# Patient Record
Sex: MALE | Race: BLACK OR AFRICAN AMERICAN | NOT HISPANIC OR LATINO | Employment: FULL TIME | URBAN - METROPOLITAN AREA
[De-identification: names, ages, dates, MRNs, and addresses within clinical notes are randomized per-mention and may not be internally consistent; named-entity substitution may affect disease eponyms.]

---

## 2020-02-05 ENCOUNTER — APPOINTMENT (EMERGENCY)
Dept: RADIOLOGY | Facility: HOSPITAL | Age: 35
End: 2020-02-05

## 2020-02-05 ENCOUNTER — HOSPITAL ENCOUNTER (EMERGENCY)
Facility: HOSPITAL | Age: 35
Discharge: HOME | End: 2020-02-05
Attending: EMERGENCY MEDICINE | Admitting: EMERGENCY MEDICINE

## 2020-02-05 VITALS
HEART RATE: 88 BPM | OXYGEN SATURATION: 97 % | WEIGHT: 147.71 LBS | RESPIRATION RATE: 17 BRPM | DIASTOLIC BLOOD PRESSURE: 55 MMHG | SYSTOLIC BLOOD PRESSURE: 108 MMHG | TEMPERATURE: 98.8 F

## 2020-02-05 DIAGNOSIS — J18.9 PNEUMONIA: Primary | ICD-10-CM

## 2020-02-05 PROCEDURE — 99284 EMERGENCY DEPT VISIT MOD MDM: CPT | Performed by: EMERGENCY MEDICINE

## 2020-02-05 PROCEDURE — 71046 X-RAY EXAM CHEST 2 VIEWS: CPT

## 2020-02-05 PROCEDURE — 99284 EMERGENCY DEPT VISIT MOD MDM: CPT

## 2020-02-05 PROCEDURE — 94640 AIRWAY INHALATION TREATMENT: CPT

## 2020-02-05 RX ORDER — ALBUTEROL SULFATE 2.5 MG/3ML
5 SOLUTION RESPIRATORY (INHALATION) ONCE
Status: COMPLETED | OUTPATIENT
Start: 2020-02-05 | End: 2020-02-05

## 2020-02-05 RX ORDER — PREDNISONE 20 MG/1
20 TABLET ORAL 2 TIMES DAILY
Qty: 10 TABLET | Refills: 0 | Status: SHIPPED | OUTPATIENT
Start: 2020-02-05 | End: 2020-02-06 | Stop reason: SDUPTHER

## 2020-02-05 RX ORDER — ALBUTEROL SULFATE 2.5 MG/3ML
2.5 SOLUTION RESPIRATORY (INHALATION) EVERY 6 HOURS PRN
COMMUNITY
End: 2020-02-06 | Stop reason: SDUPTHER

## 2020-02-05 RX ORDER — ONDANSETRON 4 MG/1
4 TABLET, ORALLY DISINTEGRATING ORAL EVERY 6 HOURS PRN
Qty: 8 TABLET | Refills: 0 | Status: SHIPPED | OUTPATIENT
Start: 2020-02-05 | End: 2020-02-06 | Stop reason: SDUPTHER

## 2020-02-05 RX ORDER — DOXYCYCLINE HYCLATE 100 MG/1
100 CAPSULE ORAL 2 TIMES DAILY
Qty: 20 CAPSULE | Refills: 0 | Status: SHIPPED | OUTPATIENT
Start: 2020-02-05 | End: 2020-02-06 | Stop reason: SDUPTHER

## 2020-02-05 RX ADMIN — IPRATROPIUM BROMIDE 0.5 MG: 0.5 SOLUTION RESPIRATORY (INHALATION) at 09:38

## 2020-02-05 RX ADMIN — ALBUTEROL SULFATE 5 MG: 2.5 SOLUTION RESPIRATORY (INHALATION) at 09:38

## 2020-02-05 RX ADMIN — PREDNISONE 50 MG: 20 TABLET ORAL at 09:37

## 2020-02-05 NOTE — DISCHARGE INSTRUCTIONS
Community Acquired Pneumonia   WHAT YOU NEED TO KNOW:   Community-acquired pneumonia (CAP) is a lung infection that you get outside of a hospital or nursing home setting  Your lungs become inflamed and cannot work well  CAP may be caused by bacteria, viruses, or fungi  DISCHARGE INSTRUCTIONS:   Return to the emergency department if:   You are confused and cannot think clearly  You have increased trouble breathing  Your lips or fingernails turn gray or blue  Contact your healthcare provider if:   Your symptoms do not get better, or they get worse  You are urinating less, or not at all  Follow up with your healthcare provider: You may need another x-ray  Deep breathing and coughing:  Deep breathing helps open the air passages in your lungs  Coughing helps bring up mucus from your lungs  Take a deep breath and hold the breath as long as you can  Then push the air out of your lungs with a deep, strong cough  Spit out any mucus you have coughed up  Take 10 deep breaths in a row every hour that you are awake  Remember to follow each deep breath with a cough  Do not smoke or allow others to smoke around you:  Nicotine and other chemicals in cigarettes and cigars can cause lung damage  Ask your healthcare provider for information if you currently smoke and need help to quit  E-cigarettes or smokeless tobacco still contain nicotine  Talk to your healthcare provider before you use these products  Manage CAP at home:   Breathe warm, moist air  This helps loosen mucus  Loosely place a warm, wet washcloth over your nose and mouth  A room humidifier may also help make the air moist     Drink liquids as directed  Ask your healthcare provider how much liquid to drink each day and which liquids to drink  Liquids help make mucus thin and easier to get out of your body  Gently tap your chest   This helps loosen mucus so it is easier to cough   Lie with your head lower than your chest several times a day and tap your chest      Get plenty of rest   Rest helps your body heal   Prevent CAP:   Wash your hands often with soap and water  Carry germ-killing hand gel with you  You can use the gel to clean your hands when soap and water are not available  Do not touch your eyes, nose, or mouth unless you have washed your hands first      Clean surfaces often  Clean doorknobs, countertops, cell phones, and other surfaces that are touched often  Always cover your mouth when you cough  Cough into a tissue or your shirtsleeve so you do not spread germs from your hands  Try to avoid people who have a cold or the flu  If you are sick, stay away from others as much as possible  Ask about vaccines  You may need a vaccine to help prevent pneumonia  Get an influenza (flu) vaccine every year as soon as it becomes available

## 2020-02-05 NOTE — ED PROVIDER NOTES
History  Chief Complaint   Patient presents with    Shortness of Breath     Pt  reports girlfriend has the flu and now he has it  Pt  reports nasal congestion and cough  Pt  reports SOB and dizziness  Pt  reports hx of asthma  Pt  SOB during triage  26 yo male with asthma, has not required admission since he was a child, never intubated, c/o onset of URI symptoms and exposure to both strep and flu in his household, was sick with flu like symptoms over 1 week ago, recovered to feel better after several days, then had recurrence of a much worse cough, associated with shortness of breath, and posttussive vomiting today  He is sneezing frequently in the ED, with nasal congestion      History provided by:  Patient  URI   Presenting symptoms: congestion, cough and sore throat    Severity:  Moderate  Onset quality:  Gradual  Duration:  1 week  Timing:  Sporadic  Progression:  Worsening  Chronicity:  New  Relieved by:  Nothing  Worsened by:  Breathing  Ineffective treatments:  Nebulizer treatments  Associated symptoms: wheezing    Risk factors: sick contacts    Risk factors: no immunosuppression        Prior to Admission Medications   Prescriptions Last Dose Informant Patient Reported? Taking? albuterol (2 5 mg/3 mL) 0 083 % nebulizer solution   Yes Yes   Sig: Take 2 5 mg by nebulization every 6 (six) hours as needed for wheezing or shortness of breath      Facility-Administered Medications: None       Past Medical History:   Diagnosis Date    Asthma        History reviewed  No pertinent surgical history  History reviewed  No pertinent family history  I have reviewed and agree with the history as documented  Social History     Tobacco Use    Smoking status: Light Tobacco Smoker    Smokeless tobacco: Never Used   Substance Use Topics    Alcohol use: Not Currently    Drug use: Never        Review of Systems   HENT: Positive for congestion and sore throat      Respiratory: Positive for cough, chest tightness and wheezing  Gastrointestinal: Positive for nausea and vomiting  All other systems reviewed and are negative  Physical Exam  Physical Exam   Constitutional: He appears well-developed and well-nourished  HENT:   Head: Atraumatic  Head is without Ly's sign, without abrasion, without contusion, without laceration, without right periorbital erythema and without left periorbital erythema  Right Ear: Tympanic membrane and external ear normal  No lacerations  Left Ear: Tympanic membrane and external ear normal  No lacerations  Nose: Mucosal edema and rhinorrhea present  No nose lacerations, nasal deformity, septal deviation or nasal septal hematoma  No epistaxis  Mouth/Throat: No lacerations  Eyes: Pupils are equal, round, and reactive to light  Conjunctivae and EOM are normal  Right eye exhibits normal extraocular motion  Left eye exhibits normal extraocular motion  Neck: Trachea normal, normal range of motion, full passive range of motion without pain and phonation normal  No spinous process tenderness present  Normal range of motion present  Cardiovascular: Normal rate, regular rhythm, normal heart sounds, intact distal pulses and normal pulses  Pulmonary/Chest: Effort normal  No accessory muscle usage  Tachypnea noted  No respiratory distress  He has wheezes  He exhibits no tenderness, no bony tenderness, no laceration, no crepitus, no deformity and no retraction  Abdominal: Soft  Normal appearance  There is no tenderness  Neurological: He is alert  He has normal strength and normal reflexes  No cranial nerve deficit or sensory deficit  Gait normal  GCS eye subscore is 4  GCS verbal subscore is 5  GCS motor subscore is 6  Skin: Skin is warm, dry and intact  Psychiatric: He has a normal mood and affect  His speech is normal and behavior is normal  Judgment and thought content normal  Cognition and memory are normal    Nursing note and vitals reviewed        Vital Signs  ED Triage Vitals [02/05/20 0900]   Temperature Pulse Respirations Blood Pressure SpO2   98 8 °F (37 1 °C) 81 (!) 24 111/59 100 %      Temp Source Heart Rate Source Patient Position - Orthostatic VS BP Location FiO2 (%)   Temporal Monitor Sitting Left arm --      Pain Score       --           Vitals:    02/05/20 0900 02/05/20 1010   BP: 111/59 108/55   Pulse: 81 88   Patient Position - Orthostatic VS: Sitting Lying         Visual Acuity      ED Medications  Medications   albuterol inhalation solution 5 mg (5 mg Nebulization Given 2/5/20 0938)   predniSONE tablet 50 mg (50 mg Oral Given 2/5/20 0937)   ipratropium (ATROVENT) 0 02 % inhalation solution 0 5 mg (0 5 mg Nebulization Given 2/5/20 1283)       Diagnostic Studies  Results Reviewed     None                 XR chest 2 views   ED Interpretation by Colt Rubi MD (02/05 1029)   RML infiltrate      Final Result by Shalini Carson MD (02/05 1047)      Right middle lobe pneumonia  Workstation performed: [id number]                    Procedures  Procedures         ED Course  ED Course as of Feb 05 1359 Wed Feb 05, 2020   1029 RML infiltrate   XR chest 2 views                               MDM      Disposition  Final diagnoses:   Pneumonia     Time reflects when diagnosis was documented in both MDM as applicable and the Disposition within this note     Time User Action Codes Description Comment    2/5/2020 10:42 AM Den Reasons Add [J18 9] Pneumonia       ED Disposition     ED Disposition Condition Date/Time Comment    Discharge Good Wed Feb 5, 2020 10:45 AM Patrick Suh Certain discharge to home/self care              Follow-up Information     Follow up With Specialties Details Why Contact Info    [facility]  Call  For followup with primary care [phone]            Discharge Medication List as of 2/5/2020 10:46 AM      START taking these medications    Details   dextromethorphan-guaifenesin (MUCINEX DM)  MG per 12 hr tablet Take 1 tablet by mouth every 12 (twelve) hours as needed for cough, Starting Wed 2/5/2020, Print      doxycycline hyclate (VIBRAMYCIN) 100 mg capsule Take 1 capsule (100 mg total) by mouth 2 (two) times a day for 10 days, Starting Wed 2/5/2020, Until Sat 2/15/2020, Print      ondansetron (ZOFRAN-ODT) 4 mg disintegrating tablet Take 1 tablet (4 mg total) by mouth every 6 (six) hours as needed for nausea or vomiting, Starting Wed 2/5/2020, Print      predniSONE 20 mg tablet Take 1 tablet (20 mg total) by mouth 2 (two) times a day, Starting Wed 2/5/2020, Print         CONTINUE these medications which have NOT CHANGED    Details   albuterol (2 5 mg/3 mL) 0 083 % nebulizer solution Take 2 5 mg by nebulization every 6 (six) hours as needed for wheezing or shortness of breath, Historical Med           No discharge procedures on file      ED Provider  Electronically Signed by           Avery Booth MD  02/05/20 0873

## 2020-02-06 ENCOUNTER — HOSPITAL ENCOUNTER (EMERGENCY)
Facility: HOSPITAL | Age: 35
Discharge: HOME | End: 2020-02-06
Attending: EMERGENCY MEDICINE | Admitting: EMERGENCY MEDICINE

## 2020-02-06 VITALS
OXYGEN SATURATION: 100 % | HEART RATE: 63 BPM | TEMPERATURE: 99.7 F | RESPIRATION RATE: 16 BRPM | DIASTOLIC BLOOD PRESSURE: 60 MMHG | SYSTOLIC BLOOD PRESSURE: 131 MMHG

## 2020-02-06 DIAGNOSIS — J18.9 PNEUMONIA: Primary | ICD-10-CM

## 2020-02-06 DIAGNOSIS — Z00.8 MEDICAL CLEARANCE FOR INCARCERATION: ICD-10-CM

## 2020-02-06 DIAGNOSIS — R05.9 COUGH: ICD-10-CM

## 2020-02-06 PROCEDURE — 94640 AIRWAY INHALATION TREATMENT: CPT

## 2020-02-06 PROCEDURE — 99283 EMERGENCY DEPT VISIT LOW MDM: CPT

## 2020-02-06 PROCEDURE — 99284 EMERGENCY DEPT VISIT MOD MDM: CPT | Performed by: EMERGENCY MEDICINE

## 2020-02-06 RX ORDER — PREDNISONE 20 MG/1
40 TABLET ORAL ONCE
Status: COMPLETED | OUTPATIENT
Start: 2020-02-06 | End: 2020-02-06

## 2020-02-06 RX ORDER — GUAIFENESIN 600 MG
600 TABLET, EXTENDED RELEASE 12 HR ORAL ONCE
Status: DISCONTINUED | OUTPATIENT
Start: 2020-02-06 | End: 2020-02-06 | Stop reason: HOSPADM

## 2020-02-06 RX ORDER — PREDNISONE 20 MG/1
20 TABLET ORAL 2 TIMES DAILY
Qty: 10 TABLET | Refills: 0 | Status: SHIPPED | OUTPATIENT
Start: 2020-02-06

## 2020-02-06 RX ORDER — DOXYCYCLINE HYCLATE 100 MG/1
100 CAPSULE ORAL ONCE
Status: COMPLETED | OUTPATIENT
Start: 2020-02-06 | End: 2020-02-06

## 2020-02-06 RX ORDER — ALBUTEROL SULFATE 2.5 MG/3ML
2.5 SOLUTION RESPIRATORY (INHALATION) EVERY 6 HOURS PRN
Qty: 30 VIAL | Refills: 0 | Status: SHIPPED | OUTPATIENT
Start: 2020-02-06

## 2020-02-06 RX ORDER — ONDANSETRON 4 MG/1
4 TABLET, ORALLY DISINTEGRATING ORAL EVERY 6 HOURS PRN
Qty: 8 TABLET | Refills: 0 | Status: SHIPPED | OUTPATIENT
Start: 2020-02-06

## 2020-02-06 RX ORDER — ONDANSETRON 4 MG/1
4 TABLET, ORALLY DISINTEGRATING ORAL ONCE
Status: COMPLETED | OUTPATIENT
Start: 2020-02-06 | End: 2020-02-06

## 2020-02-06 RX ORDER — ALBUTEROL SULFATE 2.5 MG/3ML
2.5 SOLUTION RESPIRATORY (INHALATION) ONCE
Status: COMPLETED | OUTPATIENT
Start: 2020-02-06 | End: 2020-02-06

## 2020-02-06 RX ORDER — DOXYCYCLINE HYCLATE 100 MG/1
100 CAPSULE ORAL 2 TIMES DAILY
Qty: 20 CAPSULE | Refills: 0 | Status: SHIPPED | OUTPATIENT
Start: 2020-02-06 | End: 2020-02-16

## 2020-02-06 RX ADMIN — DOXYCYCLINE 100 MG: 100 CAPSULE ORAL at 02:30

## 2020-02-06 RX ADMIN — ALBUTEROL SULFATE 2.5 MG: 2.5 SOLUTION RESPIRATORY (INHALATION) at 02:47

## 2020-02-06 RX ADMIN — ONDANSETRON 4 MG: 4 TABLET, ORALLY DISINTEGRATING ORAL at 02:30

## 2020-02-06 RX ADMIN — PREDNISONE 40 MG: 20 TABLET ORAL at 02:30

## 2020-02-06 NOTE — ED PROVIDER NOTES
History  Chief Complaint   Patient presents with    Cough     Patient diagnosed with pneumonia  Complains of cough and shortness of breath  Needs incarceration clearance  [address] yo male who was here yesterday with c/o cough and diagnosed with RML - has not filled any of his meds (doxy, mucinex, prednisone or zofran) and returns under arrest with need for medical clearance  Pt in no distress, sats 100%  History provided by:  Patient and police   used: No    Cough   Cough characteristics:  Productive  Sputum characteristics:  White  Severity:  Moderate  Onset quality:  Gradual  Duration:  2 days  Timing:  Constant  Progression:  Unchanged  Chronicity:  New  Context: sick contacts    Relieved by:  Nothing  Worsened by:  Deep breathing, exposure to cold air and activity  Ineffective treatments:  None tried  Associated symptoms: chills, rhinorrhea, shortness of breath and wheezing    Associated symptoms: no chest pain, no fever, no headaches, no rash and no sore throat    Shortness of breath:     Severity:  Mild    Onset quality:  Gradual    Duration:  2 days    Timing:  Constant    Progression:  Waxing and waning  Wheezing:     Severity:  Mild    Onset quality:  Gradual    Duration:  2 days    Timing:  Intermittent      Prior to Admission Medications   Prescriptions Last Dose Informant Patient Reported? Taking?    albuterol (2 5 mg/3 mL) 0 083 % nebulizer solution   Yes No   Sig: Take 2 5 mg by nebulization every 6 (six) hours as needed for wheezing or shortness of breath   albuterol (2 5 mg/3 mL) 0 083 % nebulizer solution   No No   Sig: Take 1 vial (2 5 mg total) by nebulization every 6 (six) hours as needed for wheezing or shortness of breath   dextromethorphan-guaifenesin (MUCINEX DM)  MG per 12 hr tablet   No No   Sig: Take 1 tablet by mouth every 12 (twelve) hours as needed for cough   dextromethorphan-guaifenesin (MUCINEX DM)  MG per 12 hr tablet   No No   Sig: Take 1 tablet by mouth every 12 (twelve) hours as needed for cough   doxycycline hyclate (VIBRAMYCIN) 100 mg capsule   No No   Sig: Take 1 capsule (100 mg total) by mouth 2 (two) times a day for 10 days   doxycycline hyclate (VIBRAMYCIN) 100 mg capsule   No No   Sig: Take 1 capsule (100 mg total) by mouth 2 (two) times a day for 10 days   ondansetron (ZOFRAN-ODT) 4 mg disintegrating tablet   No No   Sig: Take 1 tablet (4 mg total) by mouth every 6 (six) hours as needed for nausea or vomiting   ondansetron (ZOFRAN-ODT) 4 mg disintegrating tablet   No No   Sig: Take 1 tablet (4 mg total) by mouth every 6 (six) hours as needed for nausea or vomiting   predniSONE 20 mg tablet   No No   Sig: Take 1 tablet (20 mg total) by mouth 2 (two) times a day   predniSONE 20 mg tablet   No No   Sig: Take 1 tablet (20 mg total) by mouth 2 (two) times a day      Facility-Administered Medications: None       Past Medical History:   Diagnosis Date    Asthma        History reviewed  No pertinent surgical history  History reviewed  No pertinent family history  I have reviewed and agree with the history as documented  Social History     Tobacco Use    Smoking status: Light Tobacco Smoker    Smokeless tobacco: Never Used   Substance Use Topics    Alcohol use: Not Currently    Drug use: Never        Review of Systems   Constitutional: Positive for chills and fatigue  Negative for fever  HENT: Positive for congestion and rhinorrhea  Negative for sore throat  Eyes: Negative for visual disturbance  Respiratory: Positive for cough, shortness of breath and wheezing  Cardiovascular: Negative for chest pain and palpitations  Gastrointestinal: Negative for abdominal pain, diarrhea, nausea and vomiting  Genitourinary: Negative for dysuria  Musculoskeletal: Negative for neck pain and neck stiffness  Skin: Negative for pallor and rash  Neurological: Negative for headaches  Psychiatric/Behavioral: Negative for confusion     All other systems reviewed and are negative  Physical Exam  Physical Exam   Constitutional: He is oriented to person, place, and time  He appears well-developed and well-nourished  No distress  HENT:   Head: Normocephalic and atraumatic  Mouth/Throat: Oropharynx is clear and moist    Eyes: Pupils are equal, round, and reactive to light  EOM are normal    Neck: Normal range of motion  Neck supple  Cardiovascular: Normal rate and regular rhythm  No murmur heard  Pulmonary/Chest: Effort normal  He has rales (R basilar)  Abdominal: Soft  Bowel sounds are normal  He exhibits no distension  There is no tenderness  Musculoskeletal: Normal range of motion  He exhibits no edema  Neurological: He is alert and oriented to person, place, and time  Skin: Skin is warm  No rash noted  No pallor  Psychiatric: He has a normal mood and affect  His behavior is normal    Nursing note and vitals reviewed        Vital Signs  ED Triage Vitals [02/06/20 0149]   Temperature Pulse Respirations Blood Pressure SpO2   99 7 °F (37 6 °C) 63 16 131/60 100 %      Temp Source Heart Rate Source Patient Position - Orthostatic VS BP Location FiO2 (%)   Oral Monitor Sitting Right arm --      Pain Score       7           Vitals:    02/06/20 0149 02/06/20 0236   BP: 131/60 131/60   Pulse: 63 63   Patient Position - Orthostatic VS: Sitting Sitting         Visual Acuity      ED Medications  Medications   guaiFENesin (MUCINEX) 12 hr tablet 600 mg (600 mg Oral Not Given 2/6/20 0230)   predniSONE tablet 40 mg (40 mg Oral Given During Downtime 2/6/20 0230)   doxycycline hyclate (VIBRAMYCIN) capsule 100 mg (100 mg Oral Given During Downtime 2/6/20 0230)   ondansetron (ZOFRAN-ODT) dispersible tablet 4 mg (4 mg Oral Given During Downtime 2/6/20 0230)   albuterol inhalation solution 2 5 mg (2 5 mg Nebulization Given 2/6/20 0247)       Diagnostic Studies  Results Reviewed     None                 No orders to display Procedures  Procedures         ED Course  ED Course as of Feb 06 0303   Thu Feb 06, 2020   0210 Pt seen and examined  26 yo male who was here yesterday with c/o cough and diagnosed with RML - has not filled any of his meds (doxy, mucinex, prednisone or zofran) and returns under arrest with need for medical clearance  Pt in no distress, sats 100%  Discussed ability to give doses of above meds and clear for incarceration  0243 Pt requests alb neb before he goes                                     MDM      Disposition  Final diagnoses:   Pneumonia   Cough   Medical clearance for incarceration     Time reflects when diagnosis was documented in both MDM as applicable and the Disposition within this note     Time User Action Codes Description Comment    2/6/2020  2:44 AM Elvia BOLDEN Add [J18 9] Pneumonia     2/6/2020  2:44 AM Elvia BOLDEN Add [R05] Cough     2/6/2020  2:44 AM Mary Maddox Add [Z00 8] Medical clearance for incarceration       ED Disposition     ED Disposition Condition Date/Time Comment    Discharge Stable u Feb 6, 2020  2:46 AM Darryl Valeria Eisenmenger discharge to police custody, cleared for incarceration            Follow-up Information    None         Current Discharge Medication List      CONTINUE these medications which have CHANGED    Details   albuterol (2 5 mg/3 mL) 0 083 % nebulizer solution Take 1 vial (2 5 mg total) by nebulization every 6 (six) hours as needed for wheezing or shortness of breath  Qty: 30 vial, Refills: 0    Associated Diagnoses: Pneumonia      dextromethorphan-guaifenesin (MUCINEX DM)  MG per 12 hr tablet Take 1 tablet by mouth every 12 (twelve) hours as needed for cough  Qty: 10 tablet, Refills: 0    Associated Diagnoses: Pneumonia      doxycycline hyclate (VIBRAMYCIN) 100 mg capsule Take 1 capsule (100 mg total) by mouth 2 (two) times a day for 10 days  Qty: 20 capsule, Refills: 0    Associated Diagnoses: Pneumonia      ondansetron (ZOFRAN-ODT) 4 mg disintegrating tablet Take 1 tablet (4 mg total) by mouth every 6 (six) hours as needed for nausea or vomiting  Qty: 8 tablet, Refills: 0    Associated Diagnoses: Pneumonia      predniSONE 20 mg tablet Take 1 tablet (20 mg total) by mouth 2 (two) times a day  Qty: 10 tablet, Refills: 0    Associated Diagnoses: Pneumonia           No discharge procedures on file      ED Provider  Electronically Signed by           Irina Smith DO  02/06/20 0461